# Patient Record
Sex: MALE | HISPANIC OR LATINO | URBAN - METROPOLITAN AREA
[De-identification: names, ages, dates, MRNs, and addresses within clinical notes are randomized per-mention and may not be internally consistent; named-entity substitution may affect disease eponyms.]

---

## 2020-03-27 ENCOUNTER — TELEPHONE (OUTPATIENT)
Dept: OTHER | Facility: OTHER | Age: 20
End: 2020-03-27

## 2020-03-27 NOTE — TELEPHONE ENCOUNTER
Pt  needs specific medication ordered (Ventolin) so insurance can cover it, and he will need it before the weekend

## 2020-09-24 ENCOUNTER — SEXUAL HEALTH (OUTPATIENT)
Dept: SURGERY | Facility: CLINIC | Age: 20
End: 2020-09-24

## 2020-09-24 DIAGNOSIS — Z11.3 SCREENING FOR STD (SEXUALLY TRANSMITTED DISEASE): Primary | ICD-10-CM

## 2020-09-24 PROBLEM — N50.819 TESTICULAR PAIN: Status: ACTIVE | Noted: 2020-09-24

## 2020-09-24 NOTE — ASSESSMENT & PLAN NOTE
· Patient has testicular pain associated with penile pain at the base which he noticed after unprotected sexual activity with female partner  · Positive burning with urination  · 2 sexual female partners in the past year  · No condom use a few weeks ago  · Denies positive partners, or urethral discharge  · Complains of skin irritation in the genital region  Plan:  · Urine chlamydia and Gonorrhea test pending  · HIV and syphilis   · F/U with PCP for further urinary workup and testicular U/S   · Encouraged to use OTC jock itch creams for skin irritation

## 2020-09-24 NOTE — PROGRESS NOTES
Assessment/Plan:    No problem-specific Assessment & Plan notes found for this encounter  Diagnoses and all orders for this visit:    Screening for STD (sexually transmitted disease)          Subjective:      Patient ID: Anthony Fernandez is a 23 y o  male  HPI   Patient presented for STD screening  He states that he was sexually active without condom use about 2 weeks ago  He experienced testicular discomfort and pain at the base of his penis after coitus 2 weeks ago  He had one occurrence of difficulty urinating a week ago which spontaneously resolved  He contacted his PCP was informed to use OTC medication  He denied fever, hematuria, penile lesions or penile discharge  He was informed that further work up for his testicular discomfort would have to be with PCP as he could not have it done in this clinic due to the set up of the clinic  Patient understood and stated that he would follow up with PCP  He would also follow up with PCP about skin irritation  The following portions of the patient's history were reviewed and updated as appropriate: allergies, current medications, past family history, past medical history, past social history, past surgical history and problem list     Review of Systems   Constitutional: Negative for fever  Eyes: Negative  Respiratory: Negative  Cardiovascular: Negative  Genitourinary: Positive for difficulty urinating, penile pain and testicular pain  Negative for hematuria, penile swelling and urgency  Neurological: Negative  Hematological: Negative  Psychiatric/Behavioral: Negative  Objective: There were no vitals taken for this visit  Physical Exam  Nursing note reviewed  Constitutional:       Appearance: Normal appearance  Genitourinary:     Penis: Normal        Scrotum/Testes: Normal    Skin:     General: Skin is warm and dry  Findings: No erythema, lesion or rash     Neurological:      General: No focal deficit present  Mental Status: He is alert and oriented to person, place, and time  Psychiatric:         Mood and Affect: Mood normal          Behavior: Behavior normal          Thought Content: Thought content normal          Judgment: Judgment normal            CHIEF CONCERN(S)    Dull pain testicles right side off an on x 2 weeks, 1 week ago had 1 day difficult urination    Condom Used: Occasionally    Sexual Preference :  Female    Date of Last Sexual Exposure: 9/10/2020    # of Partners: Last 30 days 1, Last 90 days 2 and Last Year 2    Sexual Practices: Oral and Vaginal      Previously Sexually Transmitted Diseases  Type Date Source of Care Treatment Comment   none                         Test Date Results   RPR n/a    HIV n/a    GC n/a    CT n/a          1  CURRENT RISK BEHAVIOR(S)    Unprotected sex with multiple/anonymous partners and sex under the influence of drugs or alcohol     PREVIOUS SUCCESSES    Used condoms when having sex, Maintained a monogamous relationship with only one partner and Discussed condom use prior to having sex with partner(s)        3  SAFER GOAL BEHAVIOR(S)    Always use condoms to have sex, Practice monogamy and Get tested if condom breaks/ leaks      4  PERSON ACTION PLAN:    > BARRIERS:    Get involved in a monogamist relationship    > BENEFITS:    Provides a healthier sexual relationship and can reduce STD's        > ACTION STEPS:      Use condoms at least 50% of the time and steadily increase over time until condom use is 100% of the time, Discuss condom use prior to having sex with partner(s) and Get tested is an exposure occurred such as a condom breaks/ leaked    Test today hiv/stds  Consent for hiv test      4   REFERRALS:

## 2020-09-29 ENCOUNTER — TELEPHONE (OUTPATIENT)
Dept: SURGERY | Facility: CLINIC | Age: 20
End: 2020-09-29

## 2020-10-02 ENCOUNTER — TELEPHONE (OUTPATIENT)
Dept: UROLOGY | Facility: MEDICAL CENTER | Age: 20
End: 2020-10-02

## 2020-10-06 ENCOUNTER — OFFICE VISIT (OUTPATIENT)
Dept: UROLOGY | Facility: CLINIC | Age: 20
End: 2020-10-06
Payer: COMMERCIAL

## 2020-10-06 VITALS
DIASTOLIC BLOOD PRESSURE: 88 MMHG | BODY MASS INDEX: 24.55 KG/M2 | TEMPERATURE: 97.3 F | SYSTOLIC BLOOD PRESSURE: 114 MMHG | WEIGHT: 185.2 LBS | HEIGHT: 73 IN | HEART RATE: 82 BPM

## 2020-10-06 DIAGNOSIS — N50.819 PAIN IN TESTICLE, UNSPECIFIED LATERALITY: Primary | ICD-10-CM

## 2020-10-06 LAB
SL AMB  POCT GLUCOSE, UA: NORMAL
SL AMB LEUKOCYTE ESTERASE,UA: NORMAL
SL AMB POCT BILIRUBIN,UA: NORMAL
SL AMB POCT BLOOD,UA: NORMAL
SL AMB POCT CLARITY,UA: CLEAR
SL AMB POCT COLOR,UA: YELLOW
SL AMB POCT KETONES,UA: NORMAL
SL AMB POCT NITRITE,UA: NORMAL
SL AMB POCT PH,UA: 6.5
SL AMB POCT SPECIFIC GRAVITY,UA: 1.01
SL AMB POCT URINE PROTEIN: NORMAL
SL AMB POCT UROBILINOGEN: 0.2

## 2020-10-06 PROCEDURE — 81002 URINALYSIS NONAUTO W/O SCOPE: CPT | Performed by: UROLOGY

## 2020-10-06 PROCEDURE — 99203 OFFICE O/P NEW LOW 30 MIN: CPT | Performed by: UROLOGY

## 2020-10-06 RX ORDER — KETOCONAZOLE 20 MG/ML
SHAMPOO TOPICAL
COMMUNITY
Start: 2020-09-27

## 2020-10-08 ENCOUNTER — SEXUAL HEALTH (OUTPATIENT)
Dept: SURGERY | Facility: CLINIC | Age: 20
End: 2020-10-08

## 2020-10-08 DIAGNOSIS — Z71.2 ENCOUNTER TO DISCUSS TEST RESULTS: Primary | ICD-10-CM

## 2021-07-23 ENCOUNTER — TELEPHONE (OUTPATIENT)
Dept: UROLOGY | Facility: MEDICAL CENTER | Age: 21
End: 2021-07-23

## 2021-07-23 NOTE — TELEPHONE ENCOUNTER
Spoke to Patient's mother, Candice Jackman, regarding patient's concern  She states he gets blisters at the end of his penis after intercourse and it goes away afterwards  Mom not sure if he is using condoms or not  Pt's Mother wanted to see if any appointment available to discuss with an AP about this issue  Patient last seen with Dr Bobbi Bowens on 10/6/2020 regarding right epididymitis  Patient prefers male providers only  Provided an appointment with T[facility] Torres on 7/27  They will call if unable to keep an appointment

## 2021-07-23 NOTE — TELEPHONE ENCOUNTER
Pt managed by Dr Cobos [facility],,pt's mother asking if physician has name of Urologist in [place] he recommends as pt is now located there,as pt has blisters on penis